# Patient Record
Sex: FEMALE | Race: WHITE | NOT HISPANIC OR LATINO | Employment: OTHER | URBAN - METROPOLITAN AREA
[De-identification: names, ages, dates, MRNs, and addresses within clinical notes are randomized per-mention and may not be internally consistent; named-entity substitution may affect disease eponyms.]

---

## 2018-01-01 ENCOUNTER — OFFICE VISIT (OUTPATIENT)
Dept: PULMONOLOGY | Facility: CLINIC | Age: 33
End: 2018-01-01
Payer: MEDICARE

## 2018-01-01 ENCOUNTER — TELEPHONE (OUTPATIENT)
Dept: OBGYN CLINIC | Facility: CLINIC | Age: 33
End: 2018-01-01

## 2018-01-01 ENCOUNTER — OFFICE VISIT (OUTPATIENT)
Dept: OBGYN CLINIC | Facility: CLINIC | Age: 33
End: 2018-01-01
Payer: MEDICARE

## 2018-01-01 VITALS
DIASTOLIC BLOOD PRESSURE: 66 MMHG | OXYGEN SATURATION: 96 % | HEIGHT: 55 IN | SYSTOLIC BLOOD PRESSURE: 110 MMHG | HEART RATE: 82 BPM | TEMPERATURE: 99.1 F | BODY MASS INDEX: 10.41 KG/M2 | WEIGHT: 45 LBS

## 2018-01-01 DIAGNOSIS — N94.6 DYSMENORRHEA: ICD-10-CM

## 2018-01-01 DIAGNOSIS — G12.9 SPINAL MUSCLE ATROPHY (HCC): Primary | ICD-10-CM

## 2018-01-01 DIAGNOSIS — N93.9 ABNORMAL UTERINE BLEEDING (AUB): Primary | ICD-10-CM

## 2018-01-01 DIAGNOSIS — N94.6 DYSMENORRHEA: Primary | ICD-10-CM

## 2018-01-01 DIAGNOSIS — M41.9 SCOLIOSIS, UNSPECIFIED SCOLIOSIS TYPE, UNSPECIFIED SPINAL REGION: ICD-10-CM

## 2018-01-01 DIAGNOSIS — J96.11 CHRONIC RESPIRATORY FAILURE WITH HYPOXIA (HCC): ICD-10-CM

## 2018-01-01 PROCEDURE — 99213 OFFICE O/P EST LOW 20 MIN: CPT | Performed by: OBSTETRICS & GYNECOLOGY

## 2018-01-01 PROCEDURE — 99204 OFFICE O/P NEW MOD 45 MIN: CPT | Performed by: INTERNAL MEDICINE

## 2018-01-01 RX ORDER — NORETHINDRONE AND ETHINYL ESTRADIOL AND FERROUS FUMARATE 0.4-35(21)
KIT ORAL
Qty: 28 EACH | Refills: 11 | Status: SHIPPED | OUTPATIENT
Start: 2018-01-01

## 2018-01-01 RX ORDER — NORETHINDRONE AND ETHINYL ESTRADIOL 0.4-35(21)
KIT ORAL
Qty: 28 EACH | Refills: 6 | Status: SHIPPED | OUTPATIENT
Start: 2018-01-01 | End: 2018-01-01 | Stop reason: SDUPTHER

## 2018-01-01 RX ORDER — KETOCONAZOLE 20 MG/ML
SHAMPOO TOPICAL
Refills: 3 | COMMUNITY
Start: 2018-01-01

## 2018-01-01 RX ORDER — NORETHINDRONE AND ETHINYL ESTRADIOL AND FERROUS FUMARATE 0.4-35(21)
1 KIT ORAL DAILY
Qty: 28 EACH | Refills: 11 | Status: CANCELLED | OUTPATIENT
Start: 2018-01-01 | End: 2019-02-06

## 2018-01-01 RX ORDER — WHEY PROTEIN ISOLATE 6 G-25/7 G
POWDER (GRAM) ORAL
COMMUNITY
Start: 2017-07-11

## 2018-01-01 RX ORDER — ROPINIROLE 0.25 MG/1
TABLET, FILM COATED ORAL
COMMUNITY

## 2018-01-01 RX ORDER — NORETHINDRONE AND ETHINYL ESTRADIOL AND FERROUS FUMARATE 0.4-35(21)
KIT ORAL
COMMUNITY
Start: 2015-12-07 | End: 2018-01-01

## 2018-01-01 RX ORDER — LEVALBUTEROL INHALATION SOLUTION 0.63 MG/3ML
1 SOLUTION RESPIRATORY (INHALATION) AS NEEDED
COMMUNITY
Start: 2013-12-02

## 2018-01-01 RX ORDER — LOPERAMIDE HCL 1 MG/7.5ML
SOLUTION ORAL
COMMUNITY
Start: 2007-06-05

## 2018-01-01 RX ORDER — SIMETHICONE 125 MG
40 TABLET,CHEWABLE ORAL 4 TIMES DAILY
COMMUNITY
Start: 2015-10-26

## 2018-01-01 RX ORDER — CHOLECALCIFEROL (VITAMIN D3) 125 MCG
1 CAPSULE ORAL
COMMUNITY
Start: 2017-05-11

## 2018-01-01 RX ORDER — NORETHINDRONE AND ETHINYL ESTRADIOL AND FERROUS FUMARATE 0.4-35(21)
1 KIT ORAL
COMMUNITY
Start: 2015-08-18 | End: 2018-01-01 | Stop reason: SDUPTHER

## 2018-01-01 RX ORDER — NUT.TX.IMPAIRED DIGESTIVE FXN 6-200/48.5
POWDER IN PACKET (EA) ORAL
COMMUNITY
Start: 2013-02-12

## 2018-01-01 RX ORDER — KETOCONAZOLE 20 MG/G
CREAM TOPICAL
Refills: 2 | COMMUNITY
Start: 2018-01-01

## 2018-01-01 RX ORDER — IBUPROFEN 100 MG/1
200 TABLET, CHEWABLE ORAL AS NEEDED
COMMUNITY
Start: 2018-01-01

## 2018-01-01 RX ORDER — DRAINAGE BAG
EACH MISCELLANEOUS
COMMUNITY

## 2018-01-01 RX ORDER — ASCORBATE CALCIUM 500 MG
500 TABLET ORAL
COMMUNITY
Start: 2014-10-09

## 2018-01-30 PROBLEM — Z93.1 G TUBE FEEDINGS (HCC): Status: ACTIVE | Noted: 2017-05-23

## 2018-01-30 PROBLEM — G71.00 MUSCULAR DYSTROPHY (HCC): Status: ACTIVE | Noted: 2017-12-30

## 2018-01-30 PROBLEM — M41.9 SCOLIOSIS: Status: ACTIVE | Noted: 2017-12-30

## 2018-01-30 NOTE — PROGRESS NOTES
Consultation - Pulmonary Medicine   Shanice Poon 33 y o  female MRN: [id number]        Physician Requesting Consult:  Maurita Felty, MD  Reason for Consult:  Respiratory failure    Chronic respiratory failure with hypoxia [facility])  A chronic respiratory failure with hypoxia secondary to spinal muscular atrophy and also severe scoliosis and contracted body habitus with most likely restrictive lung disease  Currently patient is doing fine on her BiPAP for 16 hours and her current care at home  I believe at this point no further intervention needed but with her current increased sputum production and weaker cough I suggested Mucinex to help but she declined and also I gave her a flutter valve to try at home  I expect that this mucus production may improve in the next few weeks and this is all post her influenza infection recently  But in all cases if she continues to have sputum production and weak cough then she may benefit from cough assistant device, I explained to her that vest therapy will not be optimal for her given her severe scoliosis and contraction  I called [facility] and they will contact the patient and provide her with some cough assisting device based on her medical condition  We also had long discussion about tracheostomy in the future and I explained that when it is needed it is not going to change her current care at home that much except for the early time after tracheostomy but after that she should have the same care but probably with easier way to suction secretions, and later on she will be assessed for speech/Passy Zaida valve if possible  I do not have previous ABG, from records she had VBG at I do not have the results but it was commented on as no hypercapnia, and also based on her bicarb on the BMP in December it was normal so does not reflect any chronic hypercapnia      I wanted to have some numbers about her MEP/MIP and VC to have as baseline and to monitor in the future so I wanted to send her to over pulmonary Function lab but since she will continue to follow up at [facility] for now and she had some workup in the past she prefers to do further workup there if required by her pulmonologist   I answered all her question and her father's question during this encounter  They seem to understand and satisfied with the encounter today  I offered my help in the future if needed  Spinal muscle atrophy ([address] )  As above, she may need MEP/MIP and VC baseline numbers for future evaluation  Scoliosis  Causing distortion of lungs and most likely restrictive lung disease, management is as above         ______________________________________________________________________    HPI:    Shanice Poon is a 33 y o  female who presents for evaluation/2nd opinion regarding her chronic condition  Patient is 78-year-old female with past medical history of muscular dystrophy/spinal muscular atrophy and that she states it is unclear phenotype but she used to be under the care of pediatrics at [address] [place] [address] in [place]  Then she was under the care of multiple pulmonologist at [facility] and Adult medicine:  Dr Juliette Pinon, Dr Kayley Moreira, and currently under the care of Dr Zhanna Mueller  She has chronic respiratory failure and she has been on BiPAP for at least 20 years, her most recent settings are:  22/8 with respiratory rate of 24 timed mode, and for the past few months she has been on oxygen 1-1 5 L nasal cannula  She uses a nasal mask for her BiPAP, she uses her BiPAP about 16 hours every day  She also has a PEG tube for additional nutritional supplement and she is on Vivonex peds tube feeding formula at 60 mL/hour for 6-8 hours at night, and she is able to eat and drink orally    The patient was admitted to [facility] in late December 2017 with fever and difficulty breathing and was diagnosed with influenza a and treated with Tamiflu and required ICU admission for few nights on continuous BiPAP  Patient currently at baseline although she still feels some difficulty breathing/fatigue and she has morning cough to clear her secretions, she uses bouts suction after coughing in the morning to clear the secretions and then she is doing well during the day  She denies any further cough during the day  She has thick clear secretions only in the morning  She denies any fever or chills or night sweats, she denies any hemoptysis, she denies any chest pain  Patient denies any nausea or vomiting or abdominal pain or diarrhea  She denies any nasal congestion or postnasal drips or any seasonal allergy  She denies any GERD symptoms  Currently she is only on Requip for restless leg syndrome and also vitamins D and C, and also birth control or irregular menstrual periods  Review of Systems:  Review of Systems   Constitutional: Negative  HENT: Negative  Eyes: Negative  Respiratory:        As HPI   Cardiovascular: Negative  Gastrointestinal: Negative  Endocrine: Negative  Genitourinary: Negative  Musculoskeletal: Negative  Skin: Negative  Allergic/Immunologic: Negative  Neurological: Negative  Hematological: Negative  Historical Information   No past medical history on file  No past surgical history on file  Social History   History   Smoking Status    Not on file   Smokeless Tobacco    Not on file       Occupational history:  No exposure    Family History:   No family history on file        PhysicalExamination:  Vitals:   /66   Pulse 82   Temp 99 1 °F (37 3 °C)   Ht 3' 9" (1 143 m)   Wt 20 4 kg (45 lb)   SpO2 96%   BMI 15 62 kg/m²     Gen:  Alert, not in acute distress  HEENT:  PERRL, no icteric sclera or cyanosis  Neck:  Supple, no lymphadenopathy or thyromegaly  Chest:  Equal breath sounds and clear to auscultation bilaterally  Cardiac:  S1-S2 regular rate and rhythm, no murmurs  Abd: Softt  Ext:  No edema, no clubbing  Neuro:  Alert and oriented x3, contracted extremities chronically  Skin:  Intact    Diagnostic Data:  Labs: I personally reviewed the most recent laboratory data pertinent to today's visit  Creatinine 0 17, bicarb 26    PFT results:  She had PFTs in the past but I do not have access to that  Imaging:  Reports from [facility] for chest x-ray in December 2017: No definite acute pulmonary parenchymal disease given the limitations of this  study        John Veliz MD

## 2018-01-30 NOTE — ASSESSMENT & PLAN NOTE
A chronic respiratory failure with hypoxia secondary to spinal muscular atrophy and also severe scoliosis and contracted body habitus with most likely restrictive lung disease  Currently patient is doing fine on her BiPAP for 16 hours and her current care at home  I believe at this point no further intervention needed but with her current increased sputum production and weaker cough I suggested Mucinex to help but she declined and also I gave her a flutter valve to try at home  I expect that this mucus production may improve in the next few weeks and this is all post her influenza infection recently  But in all cases if she continues to have sputum production and weak cough then she may benefit from cough assistant device, I explained to her that vest therapy will not be optimal for her given her severe scoliosis and contraction  I called [facility] and they will contact the patient and provide her with some cough assisting device based on her medical condition  We also had long discussion about tracheostomy in the future and I explained that when it is needed it is not going to change her current care at home that much except for the early time after tracheostomy but after that she should have the same care but probably with easier way to suction secretions, and later on she will be assessed for speech/Passy Branson valve if possible  I do not have previous ABG, from records she had VBG at I do not have the results but it was commented on as no hypercapnia, and also based on her bicarb on the BMP in December it was normal so does not reflect any chronic hypercapnia      I wanted to have some numbers about her MEP/MIP and VC to have as baseline and to monitor in the future so I wanted to send her to over pulmonary Function lab but since she will continue to follow up at [facility] for now and she had some workup in the past she prefers to do further workup there if required by her pulmonologist   I answered all her question and her father's question during this encounter  They seem to understand and satisfied with the encounter today  I offered my help in the future if needed

## 2018-07-17 NOTE — TELEPHONE ENCOUNTER
----- Message from Filomena Santos MA sent at 7/16/2018  8:40 AM EDT -----  Pt stated she needs a prior auth done for her medication wymzya fe  She can be reached at: (home [phone]) (work [phone] ext [phone])  Case number: [id number]  [employer]: [phone]  Pt stated this was supposed to be done last week but she hadent heard anything back from our office so she had to appeal there auth  She would just like a call back,  Thanks

## 2018-08-10 PROBLEM — N93.9 ABNORMAL UTERINE BLEEDING (AUB): Status: ACTIVE | Noted: 2018-01-01

## 2018-08-10 NOTE — PROGRESS NOTES
Assessment     33 y o , continuing OCP (estrogen/progesterone), no contraindications  Diagnoses and all orders for this visit:    Abnormal uterine bleeding (AUB)    Dysmenorrhea  -     Norethin-Eth Estradiol-Fe [facility] FE) 0 4-35 MG-MCG CHEW; Take as directed and skip placebo pills and start a new pack when placebo tabs reached    Other orders  -     DM-Phenylephrine-Acetaminophen 2 5-1 25-80 MG/ML LIQD; Take 7 5 mL by mouth as needed  -     Calcium Ascorbate 500 MG TABS; 500 mg by Per G Tube route  -     Cholecalciferol 1000 UNIT/10ML LIQD; by Misc  (Non-Drug; Combo Route) route  4 x a week  -     ibuprofen (ADVIL,MOTRIN) 100 MG chewable tablet; Chew 200 mg as needed  -     ketoconazole (NIZORAL) 2 % shampoo; SHAMPOO 3X A WEEK TO SCALP, FACE AND BACK  -     ketoconazole (NIZORAL) 2 % cream; APPLY TWICE DAILY TO RASH AREAS  -     lactase (LACTAID) 3,000 units tablet; Take 1 tablet by mouth  -     levalbuterol (XOPENEX) 0 63 mg/3 mL nebulizer solution; Inhale 1 vial as needed  -     Loperamide HCl (IMODIUM A-D) 1 MG/7 5ML LIQD; by Per G Tube route  -     Feeding Tubes (FEEDING TUBE ATTACHMENT DEVICE) MISC; by Misc  (Non-Drug; Combo Route) route once  Use as directed  -     mupirocin (BACTROBAN) 2 % ointment; Apply topically as needed  -     beneprotein POWD; 2 scoops added to nightly enteral feed  -     simethicone (MYLICON) 570 MG chewable tablet; Chew 40 mg 4 (four) times a day  -     VITAMIN C, CALCIUM ASCORBATE, PO; Take by mouth  -     Cancel: Norethin-Eth Estradiol-Fe ([address] FE) 0 4-35 MG-MCG CHEW; Chew 1 tablet daily for 180 days      D/w pt risks of OCP use including thrombosis  Pt has been on this medication for several years but would like possibly another form of hormonal contraception that may decrease acne or improve her skin condition  Discussed other potential side effects including abnormal uterine bleeding, headaches, nausea or vomiting    Patient was interested in switching oral contraception due to acne  Discussed with patient potential risks from increasing estrogen dose including risk of DVT or thrombosis  For now, she will stay on her medication as prescribed in continue to take this continuously  Patient states she has a list of oral contraceptives that her insurance may cover and she could bring this to acid her next appointment  Counseling / Coordination of Care  Total time spent today15 minutes  minutes  Greater than 50% of total time was spent with the patient and / or family counseling and / or coordination of care  Plan     All questions answered  Contraception: OCP (estrogen/progesterone)  Follow-up in 6 months      Carolyn Jason is a 33 y o  female who presents for contraception counseling  The patient has no complaints today  The patient is not sexually active  Pertinent past medical history: none  Patient has SMA and is wheelchair bound  She has been on [facility] FE continuously for several years due to AUB and dysmenorrhea  She has no periods on this regimen  However, she complains of skin changes including acne and wishes to possibly start oral contraception  Menstrual History:  OB History      Para Term  AB Living    0 0 0 0 0 0    SAB TAB Ectopic Multiple Live Births    0 0 0 0 0           No LMP recorded  Patient is not currently having periods (Reason: Birth Control)  The following portions of the patient's history were reviewed and updated as appropriate: allergies, current medications, past family history, past medical history, past social history, past surgical history and problem list     Review of Systems   Constitutional: Negative  HENT: Negative  Eyes: Negative  Respiratory: Negative  Cardiovascular: Negative  Gastrointestinal: Negative  Endocrine: Negative  Genitourinary:        As noted in HPI   Musculoskeletal: Negative  Skin: Negative  Allergic/Immunologic: Negative  Neurological: Negative  Hematological: Negative  Psychiatric/Behavioral: Negative  OBGyn Exam    Awake, alert, oriented and not in acute distress  Patient is wheelchair bound and is on nasal cannula  The remainder of her physical examination was deferred as she was here today for consultation and discussion

## 2019-01-01 ENCOUNTER — HOSPITAL ENCOUNTER (EMERGENCY)
Facility: HOSPITAL | Age: 34
End: 2019-01-14
Attending: EMERGENCY MEDICINE
Payer: MEDICARE

## 2019-01-01 DIAGNOSIS — I46.9 CARDIAC ARREST (HCC): Primary | ICD-10-CM

## 2019-01-01 PROCEDURE — 99285 EMERGENCY DEPT VISIT HI MDM: CPT

## 2019-01-14 NOTE — ED ATTENDING ATTESTATION
Luisito So DO, saw and evaluated the patient  I have discussed the patient with the resident/non-physician practitioner and agree with the resident's/non-physician practitioner's findings, Plan of Care, and MDM as documented in the resident's/non-physician practitioner's note, except where noted  All available labs and Radiology studies were reviewed  At this point I agree with the current assessment done in the Emergency Department  I have conducted an independent evaluation of this patient a history and physical is as follows:    Patient presents as a cardiac arrest   I gave medical command on the patient  They called me for withdrawal of care  Patient has a history of muscular dystrophy  She recently signed a DNR, DNI but EMS was unable to find tonight  She lives with her father was also her power of   Patient had complained of shortness of breath tonight and then went into a respiratory arrest   No CPR was started  There is around 7-10 minutes of down time before EMS got there  Given house kyphotic she is this made this mechanically very hard to try to do CPR so there was no CPR continued  On command I agreed with withdrawing care as long as the power of  agreed with this which he did  EMS called back with a change of mind of the power of   She was brought in for further evaluation and treatment  A few hours before her rest she did call her family practice on-call physician who advised to go to the ER if she is having severe shortness of breath  Patient was never able to make it to the ER  Patient arrived in [place] with no palpable pulse  Bedside ultrasound confirmed no cardiac activity  There did appear to be thrombus located within the heart  Pupils were fixed and dilated  She had no palpable pulse  No respiratory motion  She was mottled  We brought the father back immediately and discussed our findings    We explained that given her underlying medical condition that resuscitation attempts would be futile  With thrombus in the heart and no cardiac activity there is very low likelihood that she would regain her normal neurologic function  Father agreed to not proceed with anything else  Time of death was called at 11:13 p m  Dunia Barry 11:55 PM  Spoke with Dr Elliot Zapata  She will send a message to her PCP, Yanet Moore to certify the death  She will send services to the house for her father    Critical Care Time  CritCare Time    Procedures

## 2019-01-14 NOTE — ED PROVIDER NOTES
History  Chief Complaint   Patient presents with    Cardiac Arrest     pt c/o SOB all day, witnessed cardiac arrest, [place] for EMS al time, pt DNR/DNI     21-year-old female with muscular dystrophy presenting to the emergency department in [place]  Per the report from EMS they were called to the scene after she lost pulses  Father told them she had been having difficulty breathing earlier in the night and he had given her some of her medications to help with her symptoms and she became unresponsive  When he checked her pulse he found her pulses  Nine was called and EMS arrived to find her in [place] and unresponsive  At that time her father reviewed her living will and stated that she would not wish to have resuscitation performed  EMS called here to the emergency department ask for further instructions on what to do and were instructed to wait until she goes into asystole before calling time of death  While waiting father listen to the chest and thought he heard a heartbeat  He requested that we bring her to the emergency department for further evaluation  On arrival she was mottled, cold, and unresponsive  She had fixed and dilated pupils and no spontaneous respirations  She was pulseless  She did not have any corneal reflexes or gag reflex  On the monitor she did have PA with a wide QRS complex a rate of approximately 20 beats per minute  On bedside ultrasound she had no cardiac activity  I discussed my findings with her father who again reiterated that she would not want CPR  Death was called at 23:13 and at this time she had progressed into asystole  Prior to Admission Medications   Prescriptions Last Dose Informant Patient Reported? Taking?    Ascorbic Acid (BART-C PO)   Yes No   Sig: Take by mouth   B Complex-C-Biotin-D-Zinc-FA (VITAL-D RX PO)   Yes No   Sig: Take 1,000 Units by mouth   Calcium Ascorbate 500 MG TABS   Yes No   Si mg by Per G Tube route   Cholecalciferol 1000 UNIT/10ML LIQD   Yes No   Sig: by Misc  (Non-Drug; Combo Route) route  4 x a week   DM-Phenylephrine-Acetaminophen 2 5-1 25-80 MG/ML LIQD   Yes No   Sig: Take 7 5 mL by mouth as needed   Feeding Tubes (FEEDING TUBE ATTACHMENT DEVICE) MISC   Yes No   Sig: by Misc  (Non-Drug; Combo Route) route once  Use as directed   Loperamide HCl (IMODIUM A-D) 1 MG/7 5ML LIQD   Yes No   Sig: by Per G Tube route   Norethin-Eth Estradiol-Fe ([address] FE) 0 4-35 MG-MCG CHEW   No No   Sig: Take as directed and skip placebo pills and start a new pack when placebo tabs reached   Nutritional Supplements (VIVONEX PEDIATRIC) PACK   Yes No   Sig: Take one pack daily   200 calories per pack during nightly enteral feeding   Respiratory Therapy Supplies (FLUTTER) NERI   No No   Sig: by Does not apply route 3 (three) times a day   VITAMIN C, CALCIUM ASCORBATE, PO   Yes No   Sig: Take by mouth   beneprotein POWD   Yes No   Si scoops added to nightly enteral feed   ibuprofen (ADVIL,MOTRIN) 100 MG chewable tablet   Yes No   Sig: Chew 200 mg as needed   ketoconazole (NIZORAL) 2 % cream   Yes No   Sig: APPLY TWICE DAILY TO RASH AREAS   ketoconazole (NIZORAL) 2 % shampoo   Yes No   Sig: SHAMPOO 3X A WEEK TO SCALP, FACE AND BACK   lactase (LACTAID) 3,000 units tablet   Yes No   Sig: Take 1 tablet by mouth   levalbuterol (XOPENEX) 0 63 mg/3 mL nebulizer solution   Yes No   Sig: Inhale 1 vial as needed   mupirocin (BACTROBAN) 2 % ointment   Yes No   Sig: Apply topically as needed   rOPINIRole (REQUIP) 0 25 mg tablet   Yes No   Sig: Take by mouth   simethicone (MYLICON) 554 MG chewable tablet   Yes No   Sig: Chew 40 mg 4 (four) times a day      Facility-Administered Medications: None       Past Medical History:   Diagnosis Date    Spinal muscular atrophy (HCC)        Past Surgical History:   Procedure Laterality Date    FEEDING TUBE PLACEMENT         Family History   Problem Relation Age of Onset    Spinal muscular atrophy Mother    Juanita Luevano Other Father epilepsy     I have reviewed and agree with the history as documented  Social History   Substance Use Topics    Smoking status: Never Smoker    Smokeless tobacco: Never Used    Alcohol use No        Review of Systems   Unable to perform ROS: Patient unresponsive       Physical Exam  ED Triage Vitals   Temp Pulse Resp BP SpO2   -- -- -- -- --      Temp src Heart Rate Source Patient Position - Orthostatic VS BP Location FiO2 (%)   -- -- -- -- --      Pain Score       --           Orthostatic Vital Signs  There were no vitals filed for this visit  Physical Exam   Constitutional:   Pale, cold, and mottled  Eyes:   Pupils fixed and dilated, no corneal reflex  Neck:   Tracheostomy tube in place  Cardiovascular:   No cardiac sounds or pulses  Pulmonary/Chest:   No spontaneous respirations  Abdominal:   G-tube in place  Musculoskeletal:   Contractures of the upper and lower extremities  Neurological:   Unresponsive   Skin: Capillary refill takes more than 3 seconds  There is pallor  Nursing note and vitals reviewed  ED Medications  Medications - No data to display    Diagnostic Studies  Results Reviewed     None                 No orders to display         Procedures  Procedures      Phone Consults  ED Phone Contact    ED Course                               MDM  Number of Diagnoses or Management Options  Cardiac arrest [facility]):     CritCare Time    Disposition  Final diagnoses:   None     ED Disposition     ED Disposition Condition Comment            Follow-up Information    None       Date, Time and Cause of Death    Date of Death:  19  Time of Death:  11:13 PM  Preliminary Cause of Death:  Respiratory arrest  Entered by:  Brandon[JS1 1]     Attribution     JS1 1 Awais Queen MD 19 23:55        Patient's Medications   Discharge Prescriptions    No medications on file     No discharge procedures on file      ED Provider  Attending physically available and evaluated Cheng [place] I managed the patient along with the ED Attending      Electronically Signed by         Arlin Best MD  01/14/19 3511

## 2022-06-23 NOTE — ED NOTES
Called and left message with medical records     Richard Gomez, [facility]  01/14/19 9155
Copy of death certificate faxed to medical records     Moy Barron, [facility]  01/14/19 4684
Dr Iqbal and Dr Taylor pronounced death  Patients PCP to certify death certificate       Ilya Ocampo RN  01/14/19 9763
Dr Taylor at bedside with ultrasound     Makayla Dowell, [facility]  01/13/19 5368
Father at bedside     Teo Tejada, [facility]  01/13/19 8505
Patient father, Cj Dahlrichard [address] [place] [address], RN  01/14/19 9686
Time of death called by rAnulfo Mendiola RN  01/13/19 5910
Refer to the Assessment tab to view/cancel completed assessment.